# Patient Record
(demographics unavailable — no encounter records)

---

## 2025-01-08 NOTE — ASSESSMENT
[FreeTextEntry1] : resolved buttock abscess and nearly healed I&D site likely due to folliculitis careful perineal hygiene recommended routine screening c-scope advised questions answered, f/u prn

## 2025-01-08 NOTE — DATA REVIEWED
[FreeTextEntry1] : [facility] ED visit progress notes and CT scan imaging;  incidental findings of bilateral renal cysts shared with patient and wife; copy of rads report given, he will f/u with his pcp for further testing, ie, renal ultrasound

## 2025-01-08 NOTE — PHYSICAL EXAM
[Normal Breath Sounds] : Normal breath sounds [Alert] : alert [Oriented to Person] : oriented to person [Oriented to Place] : oriented to place [Oriented to Time] : oriented to time [Calm] : calm [de-identified] : well appearing man ambulatory [de-identified] : anicteric, moist membranes [de-identified] : supple [de-identified] : soft [de-identified] : normal scrotum [de-identified] : small skin tag but no hemorrhoids on external inspection, no signs of residual infection, nearly epithelialized 1cm I&D site right gluteal fold, no pilonidal disease [de-identified] : no calf tenderness, no ankle edema [de-identified] : gabrielte

## 2025-01-08 NOTE — HISTORY OF PRESENT ILLNESS
[de-identified] : 44M otherwise healthy evaluated and managed in [facility] ED for right buttock abscess 12/26/24.  Here with his wife for wound check. Since d/c home (off antibiotics), the wound has essentially healed just with time and showers. Denies fever, pain with defecation or urination. Other ROS negative. 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  He reports no longer having pain while sitting. [de-identified] : PAST MEDICAL & SURGICAL HISTORY: No pertinent past medical history  No significant past surgical history  Family history not pertinent as reviewed with the patient and family  ALLERGIES: No Known Allergies  Weight (kg): 104  PHYSICAL EXAM:  General: Alert, NAD Neuro: A+Ox3 HEENT: NC/AT, no asymmetry, no scleral icterus Neck: Soft, supple Cardio: RRR Resp: Airway patent, unlabored breathing Thorax: No chest wall tenderness GI/Abd: Soft, NT/ND, no rebound/guarding, no masses palpated Vascular: All 4 extremities warm Buttock: R medial gluteal fold w induration and 2cm area of fluctuance, minimal surrounding erythema, no crepitus, no spontaneous drainage or open wound, mildly TTP IMAGING < from: CT Abdomen and Pelvis w/ IV Cont (12.27.24 @ 02:39) >  ACC: [id number] EXAM:  CT ABDOMEN AND PELVIS IC   ORDERED BY: ANI BARRERA   PROCEDURE DATE:  12/27/2024      INTERPRETATION:  CLINICAL INFORMATION: Perineum swelling and pain.  COMPARISON: None.  CONTRAST/COMPLICATIONS: IV Contrast: Omnipaque 350  90 cc administered   10 cc discarded Oral Contrast: NONE .  PROCEDURE: CT of the Abdomen and Pelvis was performed. Sagittal and coronal reformats were performed.  FINDINGS: LOWER CHEST: Within normal limits.  LIVER: Within normal limits. BILE DUCTS: Normal caliber. GALLBLADDER: Fundal adenomyomatosis. SPLEEN: Within normal limits. PANCREAS: Within normal limits. ADRENALS: Within normal limits. KIDNEYS/URETERS: Hypodense bilateral renal lesions measure 2.4 cm in the  upperpole the right kidney and 1.9 cm in the lower pole left kidney,  indeterminate. Symmetric renal enhancement without hydronephrosis.  BLADDER: Within normal limits. REPRODUCTIVE ORGANS: Prostate within normal limits.  BOWEL: No bowel obstruction. Appendix is normal. PERITONEUM/RETROPERITONEUM: Within normal limits. VESSELS: Within normal limits. LYMPH NODES: No lymphadenopathy. ABDOMINAL WALL: Small rim-enhancing collection in the inferior right  medial gluteal fold with surrounding cutaneous infiltration measures 2.2  x 2.1 x 2.4 cm. BONES: Within normal limits.  IMPRESSION: Right inferior medial gluteal fold abscess.  Bilateral hypodense renal lesions are indeterminate but may represent  proteinaceous cysts. This can be clarified with nonemergent renal  ultrasound.  --- End of Report ---     JORDEN GOLD DO; Resident Radiologist This document has been electronically signed. RADHA KENNEDY MD; Attending Radiologist This document has been electronically signed. Dec 27 2024  4:15AM  < end of copied text >